# Patient Record
Sex: MALE | URBAN - METROPOLITAN AREA
[De-identification: names, ages, dates, MRNs, and addresses within clinical notes are randomized per-mention and may not be internally consistent; named-entity substitution may affect disease eponyms.]

---

## 2023-04-05 ENCOUNTER — OFFICE VISIT (OUTPATIENT)
Facility: LOCATION | Age: 88
End: 2023-04-05
Payer: MEDICARE

## 2023-04-05 DIAGNOSIS — H16.223 KERATOCONJUNCT SICCA, NOT SPECIFIED AS SJOGREN'S, BILATERAL: ICD-10-CM

## 2023-04-05 DIAGNOSIS — H25.13 AGE-RELATED NUCLEAR CATARACT, BILATERAL: ICD-10-CM

## 2023-04-05 DIAGNOSIS — H04.123 DRY EYE SYNDROME OF BILATERAL LACRIMAL GLANDS: Primary | ICD-10-CM

## 2023-04-05 RX ORDER — CYCLOSPORINE 0 MG/ML
0.09 % SOLUTION/ DROPS OPHTHALMIC; TOPICAL
Qty: 60 | Refills: 0 | Status: ACTIVE
Start: 2023-04-05

## 2023-04-05 RX ORDER — LEVOTHYROXINE SODIUM 25 UG/1
TABLET ORAL AS DIRECTED
Qty: 1 | Refills: 0 | Status: ACTIVE
Start: 2023-04-05

## 2023-04-05 ASSESSMENT — INTRAOCULAR PRESSURE
OD: 10
OS: 10

## 2023-04-05 NOTE — IMPRESSION/PLAN
Impression: Keratoconjunct sicca, not specified as Sjogren's, bilateral: P18.230. Plan: See dry eye plan.

## 2023-04-05 NOTE — IMPRESSION/PLAN
Impression: Dry eye syndrome of bilateral lacrimal glands: H04.123. Patient complains of occasional dryness. Patient failed with using just PFATs. Explained to patient he does not have enough moisture on his/her eyes and damages are seen on the ocular surface. Recommended patient to get extended plugs to help maintain moisture and make his eye drops extend its duration on the ocular surface. Patient understands and wishes to proceed. Extended plugs x 4 done today. Patient tolerated the procedure well. Plan: Continue TheraTears Q2H OU. Continue Serum Tears Q2H OU. RTC in 6 weeks for re-evaluation with Dr. Tracey Alexander.

## 2023-04-05 NOTE — IMPRESSION/PLAN
Impression: Age-related nuclear cataract, bilateral: H25.13. Plan: Advised patient to hold off on getting new glasses rx.

## 2023-05-17 ENCOUNTER — OFFICE VISIT (OUTPATIENT)
Facility: LOCATION | Age: 88
End: 2023-05-17
Payer: MEDICARE

## 2023-05-17 DIAGNOSIS — H25.13 AGE-RELATED NUCLEAR CATARACT, BILATERAL: ICD-10-CM

## 2023-05-17 DIAGNOSIS — H04.123 DRY EYE SYNDROME OF BILATERAL LACRIMAL GLANDS: Primary | ICD-10-CM

## 2023-05-17 DIAGNOSIS — H16.223 KERATOCONJUNCT SICCA, NOT SPECIFIED AS SJOGREN'S, BILATERAL: ICD-10-CM

## 2023-05-17 PROCEDURE — 99213 OFFICE O/P EST LOW 20 MIN: CPT | Performed by: OPHTHALMOLOGY

## 2023-05-17 RX ORDER — ERYTHROMYCIN 5 MG/G
OINTMENT OPHTHALMIC
Qty: 3.5 | Refills: 11 | Status: ACTIVE
Start: 2023-05-17

## 2023-05-17 ASSESSMENT — INTRAOCULAR PRESSURE
OS: 9
OD: 10

## 2023-05-17 NOTE — IMPRESSION/PLAN
Impression: Dry eye syndrome of bilateral lacrimal glands: H04.123. Patient complains of occasional dryness but overall patient is happy with current symptoms. Explained to patient to consider amniotic membrane OU for further AYSE treatment, patient deferred at this time until cancer treatment is finished. Discussed with patient that he may come back when ready for membrane placement. Recommend to continue the use of glasses Rx and keep all future appointments with Dr. Roberta Mcrae: Colleen Em and Serum Tears Q2H OU. RTC in August 2023 for re-evaluation with Dr. Malcolm Villeda. Possible Amniotic Membrane placement this visit.

## 2023-05-17 NOTE — IMPRESSION/PLAN
Impression: Keratoconjunct sicca, not specified as Sjogren's, bilateral: O28.536. Plan: Refer to plan above.